# Patient Record
Sex: MALE | Race: WHITE | NOT HISPANIC OR LATINO
[De-identification: names, ages, dates, MRNs, and addresses within clinical notes are randomized per-mention and may not be internally consistent; named-entity substitution may affect disease eponyms.]

---

## 2022-02-28 ENCOUNTER — APPOINTMENT (OUTPATIENT)
Dept: GASTROENTEROLOGY | Facility: CLINIC | Age: 57
End: 2022-02-28
Payer: COMMERCIAL

## 2022-02-28 ENCOUNTER — APPOINTMENT (OUTPATIENT)
Dept: GASTROENTEROLOGY | Facility: CLINIC | Age: 57
End: 2022-02-28

## 2022-02-28 DIAGNOSIS — Z00.00 ENCOUNTER FOR GENERAL ADULT MEDICAL EXAMINATION W/OUT ABNORMAL FINDINGS: ICD-10-CM

## 2022-02-28 PROCEDURE — 99443: CPT

## 2022-02-28 RX ORDER — SODIUM SULFATE, MAGNESIUM SULFATE, AND POTASSIUM CHLORIDE 17.75; 2.7; 2.25 G/1; G/1; G/1
1479-225-188 TABLET ORAL
Qty: 1 | Refills: 0 | Status: ACTIVE | COMMUNITY
Start: 2022-02-28 | End: 1900-01-01

## 2022-02-28 NOTE — ASSESSMENT
[FreeTextEntry1] : 56 male average risk CRC past due for screening\par \par Plan\par Colonoscopy\par Follow-up after colonoscopy

## 2022-02-28 NOTE — HISTORY OF PRESENT ILLNESS
[Home] : at home, [unfilled] , at the time of the visit. [Medical Office: ([facility])___] : at the medical office located in  [Verbal consent obtained from patient] : the patient, [unfilled] [FreeTextEntry4] : Hair [de-identified] : 56-year-old male no past medical history referred for CRC screening\par No previous colonoscopies\par Denies any family history of colon cancer\par Hematochezia melena hematemesis dysphagia GERD weight loss\par Not on dual antiplatelet therapy\par No ACE

## 2022-04-11 ENCOUNTER — APPOINTMENT (OUTPATIENT)
Dept: GASTROENTEROLOGY | Facility: CLINIC | Age: 57
End: 2022-04-11

## 2022-04-27 ENCOUNTER — LABORATORY RESULT (OUTPATIENT)
Age: 57
End: 2022-04-27

## 2022-04-29 ENCOUNTER — RESULT REVIEW (OUTPATIENT)
Age: 57
End: 2022-04-29

## 2022-04-29 ENCOUNTER — TRANSCRIPTION ENCOUNTER (OUTPATIENT)
Age: 57
End: 2022-04-29

## 2022-04-29 ENCOUNTER — OUTPATIENT (OUTPATIENT)
Dept: OUTPATIENT SERVICES | Facility: HOSPITAL | Age: 57
LOS: 1 days | Discharge: HOME | End: 2022-04-29
Payer: COMMERCIAL

## 2022-04-29 VITALS — DIASTOLIC BLOOD PRESSURE: 58 MMHG | RESPIRATION RATE: 18 BRPM | HEART RATE: 60 BPM | SYSTOLIC BLOOD PRESSURE: 101 MMHG

## 2022-04-29 VITALS
TEMPERATURE: 97 F | RESPIRATION RATE: 18 BRPM | HEART RATE: 56 BPM | SYSTOLIC BLOOD PRESSURE: 146 MMHG | DIASTOLIC BLOOD PRESSURE: 75 MMHG | WEIGHT: 201.94 LBS | HEIGHT: 71 IN

## 2022-04-29 PROCEDURE — 88305 TISSUE EXAM BY PATHOLOGIST: CPT | Mod: 26

## 2022-04-29 PROCEDURE — 45380 COLONOSCOPY AND BIOPSY: CPT

## 2022-04-29 NOTE — CHART NOTE - NSCHARTNOTEFT_GEN_A_CORE
PACU ANESTHESIA ADMISSION NOTE      Procedure:   Post op diagnosis:      ____  Intubated  TV:______       Rate: ______      FiO2: ______    __x__  Patent Airway    x____  Full return of protective reflexes    ___x_  Full recovery from anesthesia / back to baseline     Vitals:   T:  98         R:  16                BP:105/75                  Sat:   98                P: 72      Mental Status:  x____ Awake   x_____ Alert   _____ Drowsy   _____ Sedated    Nausea/Vomiting:  ____ NO  ______Yes,   See Post - Op Orders          Pain Scale (0-10):  _____    Treatment: ____ None    ____ See Post - Op/PCA Orders    Post - Operative Fluids:   ____ Oral   ____ See Post - Op Orders    Plan: Discharge: x  ____Home       _____Floor     _____Critical Care    _____  Other:_________________    Comments:

## 2022-04-29 NOTE — ASU DISCHARGE PLAN (ADULT/PEDIATRIC) - CARE PROVIDER_API CALL
Beth Canas)  Gastroenterology; Internal Medicine  [address] [place] [id number]  Phone: [phone]  Fax: [phone]  Follow Up Time:

## 2022-04-29 NOTE — ASU PATIENT PROFILE, ADULT - FALL HARM RISK - UNIVERSAL INTERVENTIONS
Bed in lowest position, wheels locked, appropriate side rails in place/Call bell, personal items and telephone in reach/Instruct patient to call for assistance before getting out of bed or chair/Non-slip footwear when patient is out of bed/[place] to call system/Physically safe environment - no spills, clutter or unnecessary equipment/Purposeful Proactive Rounding/Room/bathroom lighting operational, light cord in reach

## 2022-04-29 NOTE — H&P PST ADULT - HISTORY OF PRESENT ILLNESS
53 year patient is here for screening colonoscopy  56 year patient is here for screening colonoscopy

## 2022-05-02 LAB — SURGICAL PATHOLOGY STUDY: SIGNIFICANT CHANGE UP

## 2022-05-04 DIAGNOSIS — K64.8 OTHER HEMORRHOIDS: ICD-10-CM

## 2022-05-04 DIAGNOSIS — D12.5 BENIGN NEOPLASM OF SIGMOID COLON: ICD-10-CM

## 2022-05-04 DIAGNOSIS — Z12.11 ENCOUNTER FOR SCREENING FOR MALIGNANT NEOPLASM OF COLON: ICD-10-CM

## 2022-05-04 DIAGNOSIS — K57.30 DIVERTICULOSIS OF LARGE INTESTINE WITHOUT PERFORATION OR ABSCESS WITHOUT BLEEDING: ICD-10-CM

## 2022-05-19 ENCOUNTER — APPOINTMENT (OUTPATIENT)
Dept: GASTROENTEROLOGY | Facility: CLINIC | Age: 57
End: 2022-05-19
Payer: COMMERCIAL

## 2022-05-19 DIAGNOSIS — D12.6 BENIGN NEOPLASM OF COLON, UNSPECIFIED: ICD-10-CM

## 2022-05-19 PROCEDURE — 99443: CPT

## 2022-05-19 NOTE — ASSESSMENT
[FreeTextEntry1] : 56 male\par LRA (low risk adenoma: 1x 3mm adenoma)\par \par Plan\par next colonosocpy in 5 years\par Follow up as needed\par

## 2022-05-19 NOTE — HISTORY OF PRESENT ILLNESS
[Home] : at home, [unfilled] , at the time of the visit. [Medical Office: ([facility])___] : at the medical office located in  [Spouse] : spouse [Verbal consent obtained from patient] : the patient, [unfilled] [_________] : Performed [unfilled] [FreeTextEntry4] : Hair [de-identified] : SP colonoscopy: Dimunitive adenoma removed by forceps [de-identified] : 56-year-old male no past medical history referred for CRC screening\par No previous colonoscopies\par Denies any family history of colon cancer\par Hematochezia melena hematemesis dysphagia GERD weight loss\par Not on dual antiplatelet therapy\par No AC

## 2022-08-17 PROBLEM — Z78.9 OTHER SPECIFIED HEALTH STATUS: Chronic | Status: ACTIVE | Noted: 2022-04-29

## 2022-09-07 ENCOUNTER — RESULT CHARGE (OUTPATIENT)
Age: 57
End: 2022-09-07

## 2022-09-07 ENCOUNTER — APPOINTMENT (OUTPATIENT)
Dept: ORTHOPEDIC SURGERY | Facility: CLINIC | Age: 57
End: 2022-09-07

## 2022-09-07 PROCEDURE — 73130 X-RAY EXAM OF HAND: CPT | Mod: 50

## 2022-09-07 PROCEDURE — 99203 OFFICE O/P NEW LOW 30 MIN: CPT

## 2022-09-07 NOTE — HISTORY OF PRESENT ILLNESS
[de-identified] : Patient has bilateral hand pain.  He says it has been going on for quite some time.  It has been going on for years.  He is taking Aleve once a day.  He says it is helpful.  He says he was given other medications which has bothered him.  He was told he has arthritis.  He does not have other complaints today.

## 2022-09-07 NOTE — IMAGING
[de-identified] :   Bilateral hands:  mild arthritic deformities seen in fingers, her buttons nodes mainly on right small finger, full range of motion of fingers, tender palpation along PIP joints, neurovascularly intact

## 2022-09-07 NOTE — ASSESSMENT
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  Unfortunately there is not a whole lot to do besides using warm water for the stiffness in the morning taking anti-inflammatories for which I will give him a different 1 as well as working on range of motion.  If he finds there is 1 or 2 joints to cause him the most pain we can always try injections at that time.  I discussed everything thoroughly with the patient.  He understands everything.  If he has a problem will return to see me.

## 2023-02-27 ENCOUNTER — RX RENEWAL (OUTPATIENT)
Age: 58
End: 2023-02-27

## 2023-06-27 ENCOUNTER — APPOINTMENT (OUTPATIENT)
Dept: ORTHOPEDIC SURGERY | Facility: CLINIC | Age: 58
End: 2023-06-27
Payer: COMMERCIAL

## 2023-06-27 DIAGNOSIS — M19.041 PRIMARY OSTEOARTHRITIS, RIGHT HAND: ICD-10-CM

## 2023-06-27 DIAGNOSIS — M19.042 PRIMARY OSTEOARTHRITIS, LEFT HAND: ICD-10-CM

## 2023-06-27 PROCEDURE — 99213 OFFICE O/P EST LOW 20 MIN: CPT

## 2023-06-27 RX ORDER — DICLOFENAC SODIUM 50 MG/1
50 TABLET, DELAYED RELEASE ORAL
Qty: 60 | Refills: 3 | Status: ACTIVE | COMMUNITY
Start: 2022-09-07 | End: 1900-01-01

## 2023-07-05 PROBLEM — M19.041 ARTHRITIS OF FINGER OF RIGHT HAND: Status: ACTIVE | Noted: 2022-09-07

## 2023-07-05 PROBLEM — M19.042 ARTHRITIS OF FINGER OF LEFT HAND: Status: ACTIVE | Noted: 2022-09-07

## 2023-07-05 NOTE — ASSESSMENT
[FreeTextEntry1] : The patient has bilateral hand arthritis at DIP and PIP joints. The patient was advised to use warm water for the stiffness in the morning. He wants to continue with the Diclofenac. I am suggesting he sees a rheumatologist. He will follow up if he has any issues. \par

## 2023-07-05 NOTE — IMAGING
[de-identified] :   Bilateral hands:  mild arthritic deformities seen in fingers, herberdens nodes mainly on right small finger, full range of motion of fingers, tender palpation along PIP joints, neurovascularly intact

## 2023-07-05 NOTE — HISTORY OF PRESENT ILLNESS
[de-identified] : The patient comes in for a follow up. He states the medicine has been working. He wakes up with stiffness in his hands.  He does not have other complaints today.

## 2023-10-23 ENCOUNTER — RX RENEWAL (OUTPATIENT)
Age: 58
End: 2023-10-23

## 2023-10-24 ENCOUNTER — RX RENEWAL (OUTPATIENT)
Age: 58
End: 2023-10-24

## 2023-11-16 ENCOUNTER — APPOINTMENT (OUTPATIENT)
Dept: ORTHOPEDIC SURGERY | Facility: CLINIC | Age: 58
End: 2023-11-16
Payer: COMMERCIAL

## 2023-11-16 DIAGNOSIS — M25.511 PAIN IN RIGHT SHOULDER: ICD-10-CM

## 2023-11-16 DIAGNOSIS — M54.12 RADICULOPATHY, CERVICAL REGION: ICD-10-CM

## 2023-11-16 PROCEDURE — 99213 OFFICE O/P EST LOW 20 MIN: CPT

## 2023-11-16 RX ORDER — TIZANIDINE 4 MG/1
4 TABLET ORAL
Qty: 30 | Refills: 1 | Status: ACTIVE | COMMUNITY
Start: 2023-11-16 | End: 1900-01-01

## 2023-11-22 ENCOUNTER — APPOINTMENT (OUTPATIENT)
Dept: ORTHOPEDIC SURGERY | Facility: CLINIC | Age: 58
End: 2023-11-22
Payer: COMMERCIAL

## 2023-11-22 DIAGNOSIS — M77.01 MEDIAL EPICONDYLITIS, RIGHT ELBOW: ICD-10-CM

## 2023-11-22 PROCEDURE — 99204 OFFICE O/P NEW MOD 45 MIN: CPT

## 2023-11-22 PROCEDURE — 99214 OFFICE O/P EST MOD 30 MIN: CPT

## 2023-12-29 ENCOUNTER — APPOINTMENT (OUTPATIENT)
Dept: ORTHOPEDIC SURGERY | Facility: CLINIC | Age: 58
End: 2023-12-29